# Patient Record
Sex: FEMALE | Race: WHITE | NOT HISPANIC OR LATINO | URBAN - METROPOLITAN AREA
[De-identification: names, ages, dates, MRNs, and addresses within clinical notes are randomized per-mention and may not be internally consistent; named-entity substitution may affect disease eponyms.]

---

## 2018-05-30 ENCOUNTER — INITIAL CONSULT (OUTPATIENT)
Dept: OPHTHALMOLOGY | Facility: CLINIC | Age: 78
End: 2018-05-30
Payer: MEDICARE

## 2018-05-30 DIAGNOSIS — Z96.1 PSEUDOPHAKIA: ICD-10-CM

## 2018-05-30 DIAGNOSIS — Z98.890 S/P LASIK (LASER ASSISTED IN SITU KERATOMILEUSIS): ICD-10-CM

## 2018-05-30 DIAGNOSIS — H53.15 DISTORTION OF VISUAL IMAGE: Primary | ICD-10-CM

## 2018-05-30 PROCEDURE — 99999 PR PBB SHADOW E&M-NEW PATIENT-LVL II: CPT | Mod: PBBFAC,,, | Performed by: OPHTHALMOLOGY

## 2018-05-30 PROCEDURE — 99202 OFFICE O/P NEW SF 15 MIN: CPT | Mod: PBBFAC | Performed by: OPHTHALMOLOGY

## 2018-05-30 PROCEDURE — 92004 COMPRE OPH EXAM NEW PT 1/>: CPT | Mod: S$PBB,,, | Performed by: OPHTHALMOLOGY

## 2018-05-30 RX ORDER — FERROUS SULFATE, DRIED 160(50) MG
TABLET, EXTENDED RELEASE ORAL
COMMUNITY
Start: 2015-12-08

## 2018-05-30 RX ORDER — MULTIVITAMIN
TABLET ORAL
COMMUNITY

## 2018-05-30 RX ORDER — ASPIRIN 81 MG/1
81 TABLET ORAL
COMMUNITY

## 2018-05-30 RX ORDER — TIOTROPIUM BROMIDE 18 UG/1
CAPSULE ORAL; RESPIRATORY (INHALATION)
COMMUNITY
Start: 2016-09-22

## 2018-05-30 RX ORDER — OMEPRAZOLE 20 MG/1
20 CAPSULE, DELAYED RELEASE ORAL DAILY
COMMUNITY

## 2018-05-30 RX ORDER — SIMVASTATIN 20 MG/1
TABLET, FILM COATED ORAL
COMMUNITY
Start: 2016-09-22

## 2018-05-30 NOTE — PROGRESS NOTES
HPI     Self referral    S/P LASIK  X 2 OU 2000 Dr. Granado  S/P Phaco w/IOL OD 08/17/2004 Dr. Granado  SLauraP Phaco w/IOL OS 04/28/2009 Dr. Granado    Patient states she is here for a second opinion. Patient states her vision   was supposed to be corrected for OD distance and OS Near. Patient states   she is happy with the left eye at near but has not been able to see well   at a distance with a CTL's. She has been going to see Dr. Duran for her   CTL'sPatient states she would like to know if there is anything she can do   to improve the vision in the right eye.       Last edited by Melissa Salgado on 5/30/2018 10:48 AM. (History)            Assessment /Plan     For exam results, see Encounter Report.    Distortion of visual image    S/P LASIK (laser assisted in situ keratomileusis)    Pseudophakia      Reassured  OK for CTls and Glasses